# Patient Record
Sex: MALE | Race: BLACK OR AFRICAN AMERICAN | NOT HISPANIC OR LATINO | Employment: UNEMPLOYED | URBAN - METROPOLITAN AREA
[De-identification: names, ages, dates, MRNs, and addresses within clinical notes are randomized per-mention and may not be internally consistent; named-entity substitution may affect disease eponyms.]

---

## 2024-06-04 ENCOUNTER — HOSPITAL ENCOUNTER (EMERGENCY)
Facility: HOSPITAL | Age: 39
Discharge: HOME | End: 2024-06-04
Attending: INTERNAL MEDICINE

## 2024-06-04 VITALS
SYSTOLIC BLOOD PRESSURE: 162 MMHG | WEIGHT: 183 LBS | BODY MASS INDEX: 27.11 KG/M2 | DIASTOLIC BLOOD PRESSURE: 86 MMHG | OXYGEN SATURATION: 100 % | TEMPERATURE: 98 F | HEART RATE: 99 BPM | RESPIRATION RATE: 18 BRPM | HEIGHT: 69 IN

## 2024-06-04 DIAGNOSIS — M79.671 RIGHT FOOT PAIN: ICD-10-CM

## 2024-06-04 DIAGNOSIS — M20.11 HALLUX VALGUS OF RIGHT FOOT: Primary | ICD-10-CM

## 2024-06-04 PROCEDURE — 99283 EMERGENCY DEPT VISIT LOW MDM: CPT | Mod: 25

## 2024-06-04 RX ORDER — DICLOFENAC SODIUM 50 MG/1
50 TABLET, DELAYED RELEASE ORAL 2 TIMES DAILY PRN
Qty: 10 TABLET | Refills: 0 | Status: SHIPPED | OUTPATIENT
Start: 2024-06-04

## 2024-06-04 NOTE — ED PROVIDER NOTES
Encounter Date: 6/4/2024       History     Chief Complaint   Patient presents with    Foot Injury     C/o right foot injury in OCT. 2023. Living in [place] at the time. C/o still having pain to right big toe.      Patient states right foot pain near his great toe x 6-7 months. States that pain began after a fall. Denies any recent injury or trauma. States that pain is intermittent and occurs with standing or palpation. Denies any PMH.     The history is provided by the patient.     Review of patient's allergies indicates:  No Known Allergies  Past Medical History:   Diagnosis Date    Right foot injury      History reviewed. No pertinent surgical history.  No family history on file.  Social History     Tobacco Use    Smoking status: Never    Smokeless tobacco: Never   Substance Use Topics    Drug use: Never     Review of Systems   Constitutional: Negative.    HENT: Negative.     Eyes: Negative.    Respiratory: Negative.     Cardiovascular: Negative.    Gastrointestinal: Negative.    Endocrine: Negative.    Genitourinary: Negative.    Musculoskeletal:         Foot Pain   Skin: Negative.    Allergic/Immunologic: Negative.    Neurological: Negative.    Hematological: Negative.    Psychiatric/Behavioral: Negative.     All other systems reviewed and are negative.      Physical Exam     Initial Vitals [06/04/24 1230]   BP Pulse Resp Temp SpO2   (!) 162/86 99 18 98.2 °F (36.8 °C) 100 %      MAP       --         Physical Exam    Nursing note and vitals reviewed.  Constitutional: He appears well-developed and well-nourished. No distress.   HENT:   Head: Normocephalic and atraumatic.   Mouth/Throat: Oropharynx is clear and moist.   Eyes: Conjunctivae and EOM are normal. Pupils are equal, round, and reactive to light.   Neck: Neck supple.   Normal range of motion.  Cardiovascular:  Normal rate, regular rhythm, normal heart sounds and intact distal pulses.           Pulses:       Dorsalis pedis pulses are 2+ on the right  side and 2+ on the left side.   Pulmonary/Chest: Breath sounds normal. No respiratory distress. He has no wheezes.   Abdominal: Abdomen is soft. He exhibits no distension. There is no abdominal tenderness.   Musculoskeletal:         General: No edema. Normal range of motion.      Cervical back: Normal range of motion and neck supple.      Right foot: Normal range of motion. Bunion (Halux Valgus deformity to first metatarsal.) present. No swelling, tenderness or bony tenderness. Deformity: Halux Valgus deformity to first metatarsal..Normal pulse.      Left foot: Normal.     Neurological: He is alert and oriented to person, place, and time. He has normal strength. GCS score is 15. GCS eye subscore is 4. GCS verbal subscore is 5. GCS motor subscore is 6.   Skin: Skin is warm and dry. No rash noted.   Psychiatric: He has a normal mood and affect. Thought content normal.         ED Course   Procedures  Labs Reviewed - No data to display       Imaging Results              X-Ray Foot Complete Right (Final result)  Result time 06/04/24 13:46:02      Final result by Cornell Cunningham MD (06/04/24 13:46:02)                   Impression:      As above.      Electronically signed by: Cornell Cunningham  Date:    06/04/2024  Time:    13:46               Narrative:    EXAMINATION:  XR FOOT COMPLETE 3 VIEW RIGHT    CLINICAL HISTORY:  Pain in right foot    TECHNIQUE:  Radiographs of the right foot with AP, lateral and oblique  views.    COMPARISON:  No prior imaging available for comparison    FINDINGS:  No displaced fracture.  Mild hallux valgus with metatarsus adductus.  The soft tissues are grossly unremarkable.                                       Medications - No data to display  Medical Decision Making  Patient states right foot pain near his great toe x 6-7 months. States that pain began after a fall. Denies any recent injury or trauma. States that pain is intermittent and occurs with standing or palpation. Denies any PMH.      The history is provided by the patient.       Amount and/or Complexity of Data Reviewed  Discussion of management or test interpretation with external provider(s): Differential diagnosis (including but not limited to):   Judging by the patient's chief complaint and pertinent history, the patient has the following possible differential diagnoses, including but not limited to the following.  Some of these are deemed to be lower likelihood and some more likely based on my physical exam and history combined with possible lab work and/or imaging studies.   Please see the pertinent studies, and refer to the HPI.  Some of these diagnoses will take further evaluation to fully rule out, perhaps as an outpatient and the patient was encouraged to follow up when discharged for more comprehensive evaluation.  Foot Pain, Fracture, Bunion   Will discharge with an anti-inflammatory for pain and instructed to use padded shoe inserts for bunion pain relief. Instructed to follow-up with Podiatry if needed. ED return precautions given.                                       Clinical Impression:  Final diagnoses:  [M79.671] Right foot pain  [M20.11] Hallux valgus of right foot (Primary)          ED Disposition Condition    Discharge Stable          ED Prescriptions       Medication Sig Dispense Start Date End Date Auth. Provider    diclofenac (VOLTAREN) 50 MG EC tablet Take 1 tablet (50 mg total) by mouth 2 (two) times daily as needed (Pain). 10 tablet 6/4/2024 -- Bernadine Flores FNP          Follow-up Information       Follow up With Specialties Details Why Contact Info    Primary Care Provider  In 3 days      Sukh Gonzalez Jr., [facility]   [address]  [place] [address]  [facility] [address]  [phone]      Anmol Murray DPM [facility]iat[facility]   [address]  [place]  [facility] [address]  [phone]               Bernadine Flores FNP  06/04/24 8123

## 2024-06-04 NOTE — ED TRIAGE NOTES
Foot Injury C/o right foot injury in OCT. 2023. Living in [place] at the time. C/o still having pain to right big toe.